# Patient Record
Sex: MALE | Race: BLACK OR AFRICAN AMERICAN | NOT HISPANIC OR LATINO | Employment: UNEMPLOYED | URBAN - METROPOLITAN AREA
[De-identification: names, ages, dates, MRNs, and addresses within clinical notes are randomized per-mention and may not be internally consistent; named-entity substitution may affect disease eponyms.]

---

## 2024-02-24 ENCOUNTER — HOSPITAL ENCOUNTER (EMERGENCY)
Facility: HOSPITAL | Age: 24
Discharge: HOME | End: 2024-02-24
Attending: EMERGENCY MEDICINE
Payer: MEDICAID

## 2024-02-24 VITALS
SYSTOLIC BLOOD PRESSURE: 151 MMHG | RESPIRATION RATE: 18 BRPM | DIASTOLIC BLOOD PRESSURE: 81 MMHG | BODY MASS INDEX: 31.83 KG/M2 | HEART RATE: 103 BPM | OXYGEN SATURATION: 99 % | HEIGHT: 72 IN | TEMPERATURE: 99 F | WEIGHT: 235 LBS

## 2024-02-24 DIAGNOSIS — Z20.2 POSSIBLE EXPOSURE TO STD: ICD-10-CM

## 2024-02-24 DIAGNOSIS — R30.0 DYSURIA: Primary | ICD-10-CM

## 2024-02-24 LAB
APPEARANCE UR: ABNORMAL
BACTERIA #/AREA URNS AUTO: ABNORMAL /HPF
BILIRUB UR QL STRIP.AUTO: ABNORMAL
C TRACH DNA SPEC QL NAA+PROBE: NOT DETECTED
COLOR UR AUTO: YELLOW
GLUCOSE UR QL STRIP.AUTO: NEGATIVE
KETONES UR QL STRIP.AUTO: ABNORMAL
LEUKOCYTE ESTERASE UR QL STRIP.AUTO: ABNORMAL
N GONORRHOEA DNA SPEC QL NAA+PROBE: DETECTED
NITRITE UR QL STRIP.AUTO: NEGATIVE
PH UR STRIP.AUTO: 6 [PH]
PROT UR QL STRIP.AUTO: ABNORMAL
RBC #/AREA URNS AUTO: ABNORMAL /HPF
RBC UR QL AUTO: ABNORMAL
SOURCE (OHS): ABNORMAL
SP GR UR STRIP.AUTO: 1.02 (ref 1–1.03)
SQUAMOUS #/AREA URNS AUTO: ABNORMAL /HPF
UROBILINOGEN UR STRIP-ACNC: 1
WBC #/AREA URNS AUTO: ABNORMAL /HPF

## 2024-02-24 PROCEDURE — 96372 THER/PROPH/DIAG INJ SC/IM: CPT | Performed by: PHYSICIAN ASSISTANT

## 2024-02-24 PROCEDURE — 87086 URINE CULTURE/COLONY COUNT: CPT | Performed by: PHYSICIAN ASSISTANT

## 2024-02-24 PROCEDURE — 63600175 PHARM REV CODE 636 W HCPCS: Performed by: PHYSICIAN ASSISTANT

## 2024-02-24 PROCEDURE — 87491 CHLMYD TRACH DNA AMP PROBE: CPT | Performed by: PHYSICIAN ASSISTANT

## 2024-02-24 PROCEDURE — 99284 EMERGENCY DEPT VISIT MOD MDM: CPT | Mod: 25

## 2024-02-24 PROCEDURE — 81003 URINALYSIS AUTO W/O SCOPE: CPT | Performed by: PHYSICIAN ASSISTANT

## 2024-02-24 RX ORDER — DOXYCYCLINE 100 MG/1
100 CAPSULE ORAL 2 TIMES DAILY
Qty: 14 CAPSULE | Refills: 0 | Status: SHIPPED | OUTPATIENT
Start: 2024-02-24 | End: 2024-02-24

## 2024-02-24 RX ORDER — CEPHALEXIN 500 MG/1
500 CAPSULE ORAL 4 TIMES DAILY
Qty: 28 CAPSULE | Refills: 0 | Status: SHIPPED | OUTPATIENT
Start: 2024-02-24 | End: 2024-03-02

## 2024-02-24 RX ORDER — CEFTRIAXONE 1 G/1
0.5 INJECTION, POWDER, FOR SOLUTION INTRAMUSCULAR; INTRAVENOUS
Status: COMPLETED | OUTPATIENT
Start: 2024-02-24 | End: 2024-02-24

## 2024-02-24 RX ORDER — DOXYCYCLINE 100 MG/1
100 CAPSULE ORAL 2 TIMES DAILY
Qty: 14 CAPSULE | Refills: 0 | Status: SHIPPED | OUTPATIENT
Start: 2024-02-24 | End: 2024-03-02

## 2024-02-24 RX ADMIN — CEFTRIAXONE SODIUM 0.5 G: 1 INJECTION, POWDER, FOR SOLUTION INTRAMUSCULAR; INTRAVENOUS at 06:02

## 2024-02-24 NOTE — DISCHARGE INSTRUCTIONS
Take full course of antibiotics.  Do not have sex for the next 2 weeks.  Follow-up with [facility] for further STD testing.

## 2024-02-24 NOTE — ED TRIAGE NOTES
Dysuria [place]om[place] when urinating onset last Tuesday. States he had a fever off onset Wednesday so he went to [facility]  on Friday but no UA was done.

## 2024-02-24 NOTE — ED PROVIDER NOTES
Encounter Date: 2/24/2024       History     Chief Complaint   Patient presents with    Dysuria     [place]omfort when urinating onset last Tuesday. States he had a fever off onset Wednesday so he went to [facility]  on Friday but no UA was done.      24-year-old male presents to ED for evaluation of dysuria and subjective fever since Tuesday.  Patient reports he had a temp of 101° at urgent care on Wednesday.  States he was has a negative for COVID and flu.  Denies any other symptoms besides burning with urination.  Denies any cough, congestion, shortness of breath, chest pain, abdominal pain, nausea, vomiting, diarrhea, or penile discharge.  Patient states he was here to have a checkup    The history is provided by the patient. No  was used.     Review of patient's allergies indicates:  No Known Allergies  History reviewed. No pertinent past medical history.  History reviewed. No pertinent surgical history.  No family history on file.     Review of Systems   Constitutional:  Positive for chills. Negative for fatigue and fever.   HENT:  Negative for congestion, ear pain, rhinorrhea and sore throat.    Respiratory:  Negative for cough, shortness of breath and wheezing.    Cardiovascular:  Negative for chest pain.   Gastrointestinal:  Negative for abdominal pain, nausea and vomiting.   Genitourinary:  Positive for dysuria.   Musculoskeletal:  Negative for myalgias.   Neurological:  Negative for headaches.   All other systems reviewed and are negative.      Physical Exam     Initial Vitals [02/24/24 1628]   BP Pulse Resp Temp SpO2   (!) 151/81 103 18 99 °F (37.2 °C) 99 %      MAP       --         Physical Exam    Nursing note and vitals reviewed.  Constitutional: He appears well-developed and well-nourished. He is cooperative.   HENT:   Head: Normocephalic and atraumatic.   Right Ear: Tympanic membrane and external ear normal.   Left Ear: Tympanic membrane and external ear normal.   Mouth/Throat: Uvula  is midline, oropharynx is clear and moist and mucous membranes are normal. No trismus in the jaw. No uvula swelling.   Eyes: Conjunctivae are normal. Pupils are equal, round, and reactive to light.   Neck: Neck supple.   Normal range of motion.  Cardiovascular:  Normal rate, regular rhythm and normal heart sounds.           Pulmonary/Chest: Breath sounds normal. No respiratory distress. He has no wheezes. He has no rhonchi. He has no rales.   Abdominal: Abdomen is soft. Bowel sounds are normal. There is no abdominal tenderness.   Musculoskeletal:         General: Normal range of motion.      Cervical back: Normal range of motion and neck supple.     Neurological: He is alert and oriented to person, place, and time.   Skin: Skin is warm and dry. Capillary refill takes less than 2 seconds.   Psychiatric: He has a normal mood and affect.         ED Course   Procedures  Labs Reviewed   URINALYSIS, REFLEX TO URINE CULTURE - Abnormal; Notable for the following components:       Result Value    Appearance, UA Cloudy (*)     Protein, UA Trace (*)     Ketones, UA Trace (*)     Blood, UA Trace (*)     Bilirubin, UA Small (*)     Leukocyte Esterase, UA Moderate (*)     All other components within normal limits   URINALYSIS, MICROSCOPIC - Abnormal; Notable for the following components:    Bacteria, UA Few (*)     WBC, UA 51-99 (*)     All other components within normal limits   CHLAMYDIA/GONORRHOEAE(GC), PCR   CULTURE, URINE          Imaging Results    None          Medications   cefTRIAXone injection 0.5 g (has no administration in time range)     Medical Decision Making  24-year-old male presents to ED for evaluation of dysuria and subjective fever since Tuesday.  Patient reports he had a temp of 101° at urgent care on Wednesday.  States he was has a negative for COVID and flu.  Denies any other symptoms besides burning with urination.  Denies any cough, congestion, shortness of breath, chest pain, abdominal pain, nausea,  vomiting, diarrhea, or penile discharge.  Patient states he was here to have a checkup    Differential diagnosis includes but isn't limited to UTI, STI, viral syndrome    Amount and/or Complexity of Data Reviewed  Labs: ordered. Decision-making details documented in ED Course.  Discussion of management or test interpretation with external provider(s): Patient is afebrile and in no acute distress.  Presents to ED for evaluation of dysuria.  Denies any penile discharge.  Patient reports having unprotected intercourse.  States he had fever last week.  UA obtained showing bacteria with leukocytes and white blood cells however no nitrites.  Discussed possible STD.  Gonorrhea chlamydia pending.  Will treat as if an STD.  Given Rocephin IM here.  Will discharge home with doxycycline and have patient follow-up with [facility] for further STD testing.               ED Course as of 02/24/24 1749   Sat Feb 24, 2024   1744 Leukocyte Esterase, UA(!): Moderate [SL]   1744 Bacteria, UA(!): Few [SL]   1744 WBC, UA(!): 51-99 [SL]      ED Course User Index  [SL] Kenisha Jones PA                           Clinical Impression:  Final diagnoses:  [R30.0] Dysuria (Primary)  [Z20.2] Possible exposure to STD          ED Disposition Condition    Discharge Stable          ED Prescriptions       Medication Sig Dispense Start Date End Date Auth. Provider    doxycycline (VIBRAMYCIN) 100 MG Cap  (Status: Discontinued) Take 1 capsule (100 mg total) by mouth 2 (two) times daily. for 7 days 14 capsule 2/24/2024 2/24/2024 Kenisha Jones PA    cephALEXin (KEFLEX) 500 MG capsule Take 1 capsule (500 mg total) by mouth 4 (four) times daily. for 7 days 28 capsule 2/24/2024 3/2/2024 Kenisha Jones PA    doxycycline (VIBRAMYCIN) 100 MG Cap Take 1 capsule (100 mg total) by mouth 2 (two) times daily. for 7 days 14 capsule 2/24/2024 3/2/2024 Kenisha Jones PA          Follow-up Information       Follow up With Specialties Details Why Contact Info    PCP  In 1  week As needed, If you do not have a PCP you may call [phone] to help get set up If you do not have a PCP you may call [phone] to help get set up.             Kenisha Jones PA  02/24/24 7828

## 2024-02-27 LAB — BACTERIA UR CULT: NORMAL

## 2024-10-30 ENCOUNTER — PATIENT MESSAGE (OUTPATIENT)
Dept: RESEARCH | Facility: HOSPITAL | Age: 24
End: 2024-10-30
Payer: MEDICAID